# Patient Record
Sex: MALE | Race: WHITE | NOT HISPANIC OR LATINO | URBAN - METROPOLITAN AREA
[De-identification: names, ages, dates, MRNs, and addresses within clinical notes are randomized per-mention and may not be internally consistent; named-entity substitution may affect disease eponyms.]

---

## 2017-01-17 PROBLEM — M54.2 CHRONIC NECK PAIN: Status: ACTIVE | Noted: 2017-01-17

## 2017-01-17 PROBLEM — G89.29 CHRONIC NECK PAIN: Status: ACTIVE | Noted: 2017-01-17

## 2017-03-07 ENCOUNTER — APPOINTMENT (RX ONLY)
Dept: URBAN - METROPOLITAN AREA CLINIC 349 | Facility: CLINIC | Age: 62
Setting detail: DERMATOLOGY
End: 2017-03-07

## 2017-03-07 DIAGNOSIS — I78.8 OTHER DISEASES OF CAPILLARIES: ICD-10-CM

## 2017-03-07 DIAGNOSIS — L57.8 OTHER SKIN CHANGES DUE TO CHRONIC EXPOSURE TO NONIONIZING RADIATION: ICD-10-CM

## 2017-03-07 PROBLEM — H91.90 UNSPECIFIED HEARING LOSS, UNSPECIFIED EAR: Status: ACTIVE | Noted: 2017-03-07

## 2017-03-07 PROBLEM — M12.9 ARTHROPATHY, UNSPECIFIED: Status: ACTIVE | Noted: 2017-03-07

## 2017-03-07 PROBLEM — D23.72 OTHER BENIGN NEOPLASM OF SKIN OF LEFT LOWER LIMB, INCLUDING HIP: Status: ACTIVE | Noted: 2017-03-07

## 2017-03-07 PROCEDURE — ? COUNSELING

## 2017-03-07 PROCEDURE — 99242 OFF/OP CONSLTJ NEW/EST SF 20: CPT

## 2017-03-07 ASSESSMENT — LOCATION DETAILED DESCRIPTION DERM
LOCATION DETAILED: LEFT CENTRAL MALAR CHEEK
LOCATION DETAILED: RIGHT INFERIOR CENTRAL MALAR CHEEK
LOCATION DETAILED: LEFT CHIN
LOCATION DETAILED: INFERIOR MID FOREHEAD

## 2017-03-07 ASSESSMENT — LOCATION ZONE DERM: LOCATION ZONE: FACE

## 2017-03-07 ASSESSMENT — LOCATION SIMPLE DESCRIPTION DERM
LOCATION SIMPLE: RIGHT CHEEK
LOCATION SIMPLE: CHIN
LOCATION SIMPLE: LEFT CHEEK
LOCATION SIMPLE: INFERIOR FOREHEAD

## 2017-07-10 PROBLEM — F33.1 MDD (MAJOR DEPRESSIVE DISORDER), RECURRENT EPISODE, MODERATE (HCC): Status: ACTIVE | Noted: 2017-07-10

## 2017-07-25 PROBLEM — K42.9 UMBILICAL HERNIA: Status: ACTIVE | Noted: 2017-07-25

## 2018-12-05 PROBLEM — E66.01 SEVERE OBESITY (HCC): Status: ACTIVE | Noted: 2018-12-05

## 2022-03-19 PROBLEM — G89.29 CHRONIC NECK PAIN: Status: ACTIVE | Noted: 2017-01-17

## 2022-03-19 PROBLEM — M54.2 CHRONIC NECK PAIN: Status: ACTIVE | Noted: 2017-01-17

## 2022-03-19 PROBLEM — K42.9 UMBILICAL HERNIA: Status: ACTIVE | Noted: 2017-07-25

## 2022-03-19 PROBLEM — E66.01 SEVERE OBESITY (HCC): Status: ACTIVE | Noted: 2018-12-05

## 2022-03-19 PROBLEM — F33.1 MDD (MAJOR DEPRESSIVE DISORDER), RECURRENT EPISODE, MODERATE (HCC): Status: ACTIVE | Noted: 2017-07-10

## 2022-03-21 ENCOUNTER — HOSPITAL ENCOUNTER (OUTPATIENT)
Dept: CT IMAGING | Age: 67
Discharge: HOME | End: 2022-03-21
Attending: FAMILY MEDICINE
Payer: COMMERCIAL

## 2022-03-21 ENCOUNTER — TRANSCRIBE ORDER (OUTPATIENT)
Dept: SCHEDULING | Age: 67
End: 2022-03-21

## 2022-03-21 DIAGNOSIS — R10.32 ABDOMINAL PAIN, LEFT LOWER QUADRANT: ICD-10-CM

## 2022-03-21 DIAGNOSIS — R10.32 ABDOMINAL PAIN, LEFT LOWER QUADRANT: Primary | ICD-10-CM

## 2022-03-21 PROCEDURE — 74011000258 HC RX REV CODE- 258: Performed by: FAMILY MEDICINE

## 2022-03-21 PROCEDURE — 74177 CT ABD & PELVIS W/CONTRAST: CPT

## 2022-03-21 PROCEDURE — 74011000636 HC RX REV CODE- 636: Performed by: FAMILY MEDICINE

## 2022-03-21 RX ORDER — SODIUM CHLORIDE 0.9 % (FLUSH) 0.9 %
10 SYRINGE (ML) INJECTION
Status: COMPLETED | OUTPATIENT
Start: 2022-03-21 | End: 2022-03-21

## 2022-03-21 RX ADMIN — Medication 10 ML: at 14:51

## 2022-03-21 RX ADMIN — DIATRIZOATE MEGLUMINE AND DIATRIZOATE SODIUM 15 ML: 660; 100 LIQUID ORAL; RECTAL at 14:51

## 2022-03-21 RX ADMIN — IOPAMIDOL 100 ML: 755 INJECTION, SOLUTION INTRAVENOUS at 14:47

## 2022-03-21 RX ADMIN — SODIUM CHLORIDE 100 ML: 9 INJECTION, SOLUTION INTRAVENOUS at 14:51

## 2023-01-06 ENCOUNTER — OFFICE VISIT (OUTPATIENT)
Dept: UROLOGY | Age: 68
End: 2023-01-06

## 2023-01-06 DIAGNOSIS — N40.1 BPH WITH OBSTRUCTION/LOWER URINARY TRACT SYMPTOMS: ICD-10-CM

## 2023-01-06 DIAGNOSIS — N13.8 BPH WITH OBSTRUCTION/LOWER URINARY TRACT SYMPTOMS: ICD-10-CM

## 2023-01-06 DIAGNOSIS — R35.0 FREQUENCY OF MICTURITION: Primary | ICD-10-CM

## 2023-01-06 DIAGNOSIS — N40.2 NODULAR PROSTATE WITHOUT LOWER URINARY TRACT SYMPTOMS: ICD-10-CM

## 2023-01-06 LAB
BILIRUBIN, URINE, POC: NEGATIVE
BLOOD URINE, POC: NEGATIVE
GLUCOSE URINE, POC: NEGATIVE
KETONES, URINE, POC: NEGATIVE
LEUKOCYTE ESTERASE, URINE, POC: NEGATIVE
NITRITE, URINE, POC: NEGATIVE
PH, URINE, POC: 5.5 (ref 4.6–8)
PROTEIN,URINE, POC: 30
PSA SERPL-MCNC: 3.4 NG/ML
SPECIFIC GRAVITY, URINE, POC: 1.02 (ref 1–1.03)
URINALYSIS CLARITY, POC: ABNORMAL
URINALYSIS COLOR, POC: ABNORMAL
UROBILINOGEN, POC: ABNORMAL

## 2023-01-06 NOTE — PROGRESS NOTES
[facility]  [address] [place] [address]  [facility], [address] [facility]  [address] [facility] [place]. : 1955    Chief Complaint   Patient presents with    Other     Yearly- frequency         HPI     Prudence Kearney. is a 74 y.o. male    referred by Dr. Nichelle Palacios at  [facility] for evaluation and treatment of prostate nodule. Had right prostate nodule on exam. PSA 1.85 in 10-21. Takes tamsulosin. He doesn't have severe LUTS. Stream is a little slow. No hematuria . JENNIFER in : not enlarged, asymmetric, right>left, normal consistency, no nodules  asymmetric prostate, no nodule. Not very suspicious. Recommended yearly PSA monitoring   He states that he had his PSA done by his PCP in 10-22 and he was told that PSA was \"a little high\". I don't see the report .   Past Medical History:   Diagnosis Date    Cervical spondylosis 10/1/2013    Depression     Dyslipidemia     GERD (gastroesophageal reflux disease)     History of CVA (cerebrovascular accident)     embolic    HTN (hypertension), benign     Impaired fasting glucose 10/1/2013    Low HDL (under 40) 10/1/2013    Microalbuminuria     Migraine headache 10/1/2013    NAFLD (nonalcoholic fatty liver disease) 10/1/2013    LAYTON (obstructive sleep apnea) 10/1/2013     Past Surgical History:   Procedure Laterality Date    CERVICAL FUSION      C5-6    CHOLECYSTECTOMY      HERNIA REPAIR      MITRAL VALVE REPLACEMENT      OK CARDIAC SURG PROCEDURE UNLIST      mitral valve repair    TONSILLECTOMY       Current Outpatient Medications   Medication Sig Dispense Refill    metFORMIN (GLUCOPHAGE) 500 MG tablet metformin 500 mg tablet      aspirin 81 MG EC tablet Take by mouth daily      atorvastatin (LIPITOR) 20 MG tablet Take 20 mg by mouth      esomeprazole (NEXIUM) 20 MG delayed release capsule Take 1 capsule by mouth 2 times daily      magnesium oxide (MAG-OX) 400 MG tablet Take 400 mg by mouth daily      nabumetone (RELAFEN) 500 MG tablet 1 tab po bid prn pain      rizatriptan (MAXALT-MLT) 10 MG disintegrating tablet 1 tab po at onset of migraine; may repeat in 2 hrs if unresolved; max 2 tabs/24 hrs      sertraline (ZOLOFT) 100 MG tablet Take one tab po qd      tamsulosin (FLOMAX) 0.4 MG capsule Take 0.4 mg by mouth daily      topiramate (TOPAMAX) 50 MG tablet Take 50 mg by mouth 2 times daily      Wheat Dextrin (BENEFIBER) POWD Take by mouth      fexofenadine (ALLEGRA) 180 MG tablet Take by mouth      loratadine (CLARITIN) 10 MG capsule Take by mouth       No current facility-administered medications for this visit. Allergies   Allergen Reactions    Penicillins Other (See Comments)     Social History     Socioeconomic History    Marital status:      Spouse name: Not on file    Number of children: Not on file    Years of education: Not on file    Highest education level: Not on file   Occupational History    Not on file   Tobacco Use    Smoking status: Never    Smokeless tobacco: Never   Substance and Sexual Activity    Alcohol use: No    Drug use: No    Sexual activity: Not on file   Other Topics Concern    Not on file   Social History Narrative    Not on file     Social Determinants of Health     Financial Resource Strain: Not on file   Food Insecurity: Not on file   Transportation Needs: Not on file   Physical Activity: Not on file   Stress: Not on file   Social Connections: Not on file   Intimate Partner Violence: Not on file   Housing Stability: Not on file     Family History   Problem Relation Age of Onset    Cancer Father         prostate    Hypertension Mother     Kidney Disease Mother         Felicity dz       Review of Systems  Constitutional: Negative  GI: Neg GI ROS  Genitourinary: Genitourinary negative    There were no vitals taken for this visit.     Urinalysis  UA - Dipstick  Results for orders placed or performed in visit on 01/06/23   AMB POC URINALYSIS DIP STICK AUTO W/O MICRO   Result Value Ref Range Color (UA POC)      Clarity (UA POC)      Glucose, Urine, POC Negative Negative    Bilirubin, Urine, POC Negative Negative    KETONES, Urine, POC Negative Negative    Specific Gravity, Urine, POC 1.025 1.001 - 1.035    Blood (UA POC) Negative Negative    pH, Urine, POC 5.5 4.6 - 8.0    Protein, Urine, POC 30 Negative    Urobilinogen, POC 2 mg/dL     Nitrite, Urine, POC Negative Negative    Leukocyte Esterase, Urine, POC Negative Negative       UA - Micro  WBC - 0  RBC - 0  Bacteria - 0  Epith - 0    Physical Exam    Assessment and Plan    ICD-10-CM    1. Frequency of micturition  R35.0 AMB POC URINALYSIS DIP STICK AUTO W/O MICRO      2. Nodular prostate without lower urinary tract symptoms  N40.2 AMB POC URINALYSIS DIP STICK AUTO W/O MICRO     PSA, Diagnostic      3. BPH with obstruction/lower urinary tract symptoms  N40.1     N13.8         He would like to have the PSA checked today. Call patient with results  Continue tamsulosin. Orders Placed This Encounter   Procedures    PSA, Diagnostic     Standing Status:   Future     Standing Expiration Date:   1/6/2024    AMB POC URINALYSIS DIP STICK AUTO W/O MICRO       Follow-up and Dispositions    Return for call patient to arrange follow-up.

## 2023-09-06 NOTE — PROGRESS NOTES
[address] [place] [address]  [phone]    Patient Name:  Evan Bustamante. YOB: 1955      Office Visit 9/7/2023    CHIEF COMPLAINT:    Chief Complaint   Patient presents with    Follow-up         HISTORY OF PRESENT ILLNESS:  Patient is a 73 yo  male seen today for follow up of sleep apnea. Patient's sleep study in 2006 revealed RDI of 75 with lowest desaturation 73%. He is prescribed cpap therapy with a humidifier set at 14cm with a nasal mask. Most recent download reveals AHI on PAP therapy is 0.8, leak is 35.8 and the hourly usage is 9 hours 25 minutes nightly. The overall use is 3458 hours with days greater than four hours at 364/365. The patient is compliant with the Pap therapy and is feeling better as a result. Patient states he is rested in the morning, does have some daytime fatigue and may nap if he has been working outside in the heat. Current [place] score is 10/24. He is falling asleep easily and denies any frequent nocturnal awakenings. States CPAP is functioning well and the pressure feels fine. He does not notice a mask leak. He denies any issues with hypertension or lower extremity edema. He denies any changes to medical history or weight changes since last visit.     Download        [place] Sleepiness Scale  Sleep Medicine 9/7/2023 1/31/2022   Sitting and reading 2 1   Watching TV 1 1   Sitting, inactive in a public place (e.g. a theatre or a meeting) 1 1   As a passenger in a car for an hour without a break 2 2   Lying down to rest in the afternoon when circumstances permit 3 1   Sitting and talking to someone 0 0   Sitting quietly after a lunch without alcohol 1 1   In a car, while stopped for a few minutes in traffic 0 0   [place] Sleepiness Score 10 7          Past Medical History:   Diagnosis Date    Cervical spondylosis 10/1/2013    Depression     Dyslipidemia     GERD (gastroesophageal reflux disease)

## 2023-09-07 ENCOUNTER — OFFICE VISIT (OUTPATIENT)
Dept: SLEEP MEDICINE | Age: 68
End: 2023-09-07
Payer: MEDICARE

## 2023-09-07 VITALS
BODY MASS INDEX: 32.65 KG/M2 | OXYGEN SATURATION: 97 % | RESPIRATION RATE: 17 BRPM | SYSTOLIC BLOOD PRESSURE: 138 MMHG | DIASTOLIC BLOOD PRESSURE: 78 MMHG | TEMPERATURE: 97 F | WEIGHT: 196 LBS | HEIGHT: 65 IN | HEART RATE: 77 BPM

## 2023-09-07 DIAGNOSIS — G47.33 OBSTRUCTIVE SLEEP APNEA (ADULT) (PEDIATRIC): Primary | ICD-10-CM

## 2023-09-07 PROCEDURE — 99213 OFFICE O/P EST LOW 20 MIN: CPT | Performed by: STUDENT IN AN ORGANIZED HEALTH CARE EDUCATION/TRAINING PROGRAM

## 2023-09-07 PROCEDURE — 3078F DIAST BP <80 MM HG: CPT | Performed by: STUDENT IN AN ORGANIZED HEALTH CARE EDUCATION/TRAINING PROGRAM

## 2023-09-07 PROCEDURE — 1123F ACP DISCUSS/DSCN MKR DOCD: CPT | Performed by: STUDENT IN AN ORGANIZED HEALTH CARE EDUCATION/TRAINING PROGRAM

## 2023-09-07 PROCEDURE — 1036F TOBACCO NON-USER: CPT | Performed by: STUDENT IN AN ORGANIZED HEALTH CARE EDUCATION/TRAINING PROGRAM

## 2023-09-07 PROCEDURE — 3075F SYST BP GE 130 - 139MM HG: CPT | Performed by: STUDENT IN AN ORGANIZED HEALTH CARE EDUCATION/TRAINING PROGRAM

## 2023-09-07 PROCEDURE — G8427 DOCREV CUR MEDS BY ELIG CLIN: HCPCS | Performed by: STUDENT IN AN ORGANIZED HEALTH CARE EDUCATION/TRAINING PROGRAM

## 2023-09-07 PROCEDURE — 3017F COLORECTAL CA SCREEN DOC REV: CPT | Performed by: STUDENT IN AN ORGANIZED HEALTH CARE EDUCATION/TRAINING PROGRAM

## 2023-09-07 PROCEDURE — G8417 CALC BMI ABV UP PARAM F/U: HCPCS | Performed by: STUDENT IN AN ORGANIZED HEALTH CARE EDUCATION/TRAINING PROGRAM

## 2023-09-07 ASSESSMENT — SLEEP AND FATIGUE QUESTIONNAIRES
HOW LIKELY ARE YOU TO NOD OFF OR FALL ASLEEP IN A CAR, WHILE STOPPED FOR A FEW MINUTES IN TRAFFIC: 0
HOW LIKELY ARE YOU TO NOD OFF OR FALL ASLEEP WHILE WATCHING TV: 1
HOW LIKELY ARE YOU TO NOD OFF OR FALL ASLEEP WHILE SITTING AND TALKING TO SOMEONE: 0
HOW LIKELY ARE YOU TO NOD OFF OR FALL ASLEEP WHILE LYING DOWN TO REST IN THE AFTERNOON WHEN CIRCUMSTANCES PERMIT: 3
HOW LIKELY ARE YOU TO NOD OFF OR FALL ASLEEP WHILE SITTING AND READING: 2
HOW LIKELY ARE YOU TO NOD OFF OR FALL ASLEEP WHILE SITTING QUIETLY AFTER LUNCH WITHOUT ALCOHOL: 1
HOW LIKELY ARE YOU TO NOD OFF OR FALL ASLEEP WHILE SITTING INACTIVE IN A PUBLIC PLACE: 1
HOW LIKELY ARE YOU TO NOD OFF OR FALL ASLEEP WHEN YOU ARE A PASSENGER IN A CAR FOR AN HOUR WITHOUT A BREAK: 2
ESS TOTAL SCORE: 10

## 2023-09-07 NOTE — PATIENT INSTRUCTIONS
The company who will be taking care of your CPAP supplies is:     Address: [address] [place] [address] [address], [address]  Phone: [phone] Option #1  Fax: [phone]

## 2024-09-09 ENCOUNTER — OFFICE VISIT (OUTPATIENT)
Dept: SLEEP MEDICINE | Age: 69
End: 2024-09-09
Payer: MEDICARE

## 2024-09-09 VITALS
HEIGHT: 65 IN | OXYGEN SATURATION: 91 % | SYSTOLIC BLOOD PRESSURE: 155 MMHG | WEIGHT: 204 LBS | BODY MASS INDEX: 33.99 KG/M2 | HEART RATE: 84 BPM | RESPIRATION RATE: 17 BRPM | DIASTOLIC BLOOD PRESSURE: 72 MMHG

## 2024-09-09 DIAGNOSIS — G47.33 OSA (OBSTRUCTIVE SLEEP APNEA): Primary | ICD-10-CM

## 2024-09-09 DIAGNOSIS — E66.9 OBESITY (BMI 30.0-34.9): ICD-10-CM

## 2024-09-09 PROBLEM — E66.811 OBESITY (BMI 30.0-34.9): Status: ACTIVE | Noted: 2024-09-09

## 2024-09-09 PROCEDURE — G8427 DOCREV CUR MEDS BY ELIG CLIN: HCPCS | Performed by: NURSE PRACTITIONER

## 2024-09-09 PROCEDURE — G8417 CALC BMI ABV UP PARAM F/U: HCPCS | Performed by: NURSE PRACTITIONER

## 2024-09-09 PROCEDURE — G2211 COMPLEX E/M VISIT ADD ON: HCPCS | Performed by: NURSE PRACTITIONER

## 2024-09-09 PROCEDURE — 3017F COLORECTAL CA SCREEN DOC REV: CPT | Performed by: NURSE PRACTITIONER

## 2024-09-09 PROCEDURE — 1123F ACP DISCUSS/DSCN MKR DOCD: CPT | Performed by: NURSE PRACTITIONER

## 2024-09-09 PROCEDURE — 1036F TOBACCO NON-USER: CPT | Performed by: NURSE PRACTITIONER

## 2024-09-09 PROCEDURE — 3077F SYST BP >= 140 MM HG: CPT | Performed by: NURSE PRACTITIONER

## 2024-09-09 PROCEDURE — 3078F DIAST BP <80 MM HG: CPT | Performed by: NURSE PRACTITIONER

## 2024-09-09 PROCEDURE — 99213 OFFICE O/P EST LOW 20 MIN: CPT | Performed by: NURSE PRACTITIONER

## 2024-09-09 RX ORDER — NORTRIPTYLINE HCL 10 MG
CAPSULE ORAL
COMMUNITY
Start: 2024-07-23

## 2024-09-09 ASSESSMENT — SLEEP AND FATIGUE QUESTIONNAIRES
HOW LIKELY ARE YOU TO NOD OFF OR FALL ASLEEP IN A CAR, WHILE STOPPED FOR A FEW MINUTES IN TRAFFIC: WOULD NEVER DOZE
HOW LIKELY ARE YOU TO NOD OFF OR FALL ASLEEP WHILE SITTING AND TALKING TO SOMEONE: WOULD NEVER DOZE
HOW LIKELY ARE YOU TO NOD OFF OR FALL ASLEEP WHILE SITTING AND READING: SLIGHT CHANCE OF DOZING
ESS TOTAL SCORE: 6
HOW LIKELY ARE YOU TO NOD OFF OR FALL ASLEEP WHILE SITTING INACTIVE IN A PUBLIC PLACE: WOULD NEVER DOZE
HOW LIKELY ARE YOU TO NOD OFF OR FALL ASLEEP WHEN YOU ARE A PASSENGER IN A CAR FOR AN HOUR WITHOUT A BREAK: MODERATE CHANCE OF DOZING
HOW LIKELY ARE YOU TO NOD OFF OR FALL ASLEEP WHILE WATCHING TV: SLIGHT CHANCE OF DOZING
HOW LIKELY ARE YOU TO NOD OFF OR FALL ASLEEP WHILE SITTING QUIETLY AFTER LUNCH WITHOUT ALCOHOL: SLIGHT CHANCE OF DOZING
HOW LIKELY ARE YOU TO NOD OFF OR FALL ASLEEP WHILE LYING DOWN TO REST IN THE AFTERNOON WHEN CIRCUMSTANCES PERMIT: SLIGHT CHANCE OF DOZING

## 2025-04-11 ENCOUNTER — OFFICE VISIT (OUTPATIENT)
Dept: SLEEP MEDICINE | Age: 70
End: 2025-04-11
Payer: MEDICARE

## 2025-04-11 VITALS
HEART RATE: 88 BPM | BODY MASS INDEX: 36.19 KG/M2 | OXYGEN SATURATION: 95 % | SYSTOLIC BLOOD PRESSURE: 143 MMHG | RESPIRATION RATE: 19 BRPM | HEIGHT: 64 IN | WEIGHT: 212 LBS | DIASTOLIC BLOOD PRESSURE: 84 MMHG | TEMPERATURE: 97.2 F

## 2025-04-11 DIAGNOSIS — G47.33 OSA (OBSTRUCTIVE SLEEP APNEA): Primary | ICD-10-CM

## 2025-04-11 DIAGNOSIS — E66.9 OBESITY (BMI 30-39.9): ICD-10-CM

## 2025-04-11 PROCEDURE — 3079F DIAST BP 80-89 MM HG: CPT | Performed by: PHYSICIAN ASSISTANT

## 2025-04-11 PROCEDURE — 3017F COLORECTAL CA SCREEN DOC REV: CPT | Performed by: PHYSICIAN ASSISTANT

## 2025-04-11 PROCEDURE — G8417 CALC BMI ABV UP PARAM F/U: HCPCS | Performed by: PHYSICIAN ASSISTANT

## 2025-04-11 PROCEDURE — 1036F TOBACCO NON-USER: CPT | Performed by: PHYSICIAN ASSISTANT

## 2025-04-11 PROCEDURE — 1160F RVW MEDS BY RX/DR IN RCRD: CPT | Performed by: PHYSICIAN ASSISTANT

## 2025-04-11 PROCEDURE — G8427 DOCREV CUR MEDS BY ELIG CLIN: HCPCS | Performed by: PHYSICIAN ASSISTANT

## 2025-04-11 PROCEDURE — 3077F SYST BP >= 140 MM HG: CPT | Performed by: PHYSICIAN ASSISTANT

## 2025-04-11 PROCEDURE — 1159F MED LIST DOCD IN RCRD: CPT | Performed by: PHYSICIAN ASSISTANT

## 2025-04-11 PROCEDURE — 1123F ACP DISCUSS/DSCN MKR DOCD: CPT | Performed by: PHYSICIAN ASSISTANT

## 2025-04-11 PROCEDURE — 99214 OFFICE O/P EST MOD 30 MIN: CPT | Performed by: PHYSICIAN ASSISTANT

## 2025-04-11 ASSESSMENT — SLEEP AND FATIGUE QUESTIONNAIRES
HOW LIKELY ARE YOU TO NOD OFF OR FALL ASLEEP WHEN YOU ARE A PASSENGER IN A CAR FOR AN HOUR WITHOUT A BREAK: MODERATE CHANCE OF DOZING
HOW LIKELY ARE YOU TO NOD OFF OR FALL ASLEEP WHILE LYING DOWN TO REST IN THE AFTERNOON WHEN CIRCUMSTANCES PERMIT: HIGH CHANCE OF DOZING
HOW LIKELY ARE YOU TO NOD OFF OR FALL ASLEEP WHILE WATCHING TV: SLIGHT CHANCE OF DOZING
ESS TOTAL SCORE: 8
HOW LIKELY ARE YOU TO NOD OFF OR FALL ASLEEP WHILE SITTING INACTIVE IN A PUBLIC PLACE: WOULD NEVER DOZE
HOW LIKELY ARE YOU TO NOD OFF OR FALL ASLEEP WHILE SITTING AND READING: MODERATE CHANCE OF DOZING
HOW LIKELY ARE YOU TO NOD OFF OR FALL ASLEEP WHILE SITTING AND TALKING TO SOMEONE: WOULD NEVER DOZE
HOW LIKELY ARE YOU TO NOD OFF OR FALL ASLEEP IN A CAR, WHILE STOPPED FOR A FEW MINUTES IN TRAFFIC: WOULD NEVER DOZE
HOW LIKELY ARE YOU TO NOD OFF OR FALL ASLEEP WHILE SITTING QUIETLY AFTER LUNCH WITHOUT ALCOHOL: WOULD NEVER DOZE

## 2025-04-11 NOTE — PROGRESS NOTES
[facility]  [address]  [place] [address]  [phone]    Patient Name:  Saul Yates Jr.  YOB: 1955      Office Visit 4/11/2025    CHIEF COMPLAINT:    Chief Complaint   Patient presents with    Follow-up    Sleep Apnea         HISTORY OF PRESENT ILLNESS:  Patient is an 69 y.o. male seen today for follow up of LAYTON. Pt had a PSG/HST on 11/17/06 with an RDI of 75/hr with desaturations to 73%. Pt is on CPAP 14 cm H2O.   Pt reports that he is doing well. He uses a nasal mask. He usually sleeps well. He admits that he is not very active and so his energy is not as good as he would like. He also notes that he has gained some weight since his last visit. He is under a lot of stress with a friend of his being in the hospital for over one month. He is going to visit him after his appt today and he thinks that that is why his BP is elevated today.   Pt does nap at times but he always uses his CPAP when he sleeps. He has excellent compliance with use 209/214 days with an average use of 9 hrs and 38 mins per day. Pt has a mask leak of 1.4L/min with an AHI of 0.7/hr. Pt is benefiting and tolerating PAP therapy.             4/11/2025    12:39 PM 9/9/2024    10:35 AM 9/7/2023     9:29 AM 1/31/2022     1:46 PM   Sleep Medicine   Sitting and reading 2 1 2 1   Watching TV 1 1 1 1   Sitting, inactive in a public place (e.g. a theatre or a meeting) 0 0 1 1   As a passenger in a car for an hour without a break 2 2 2 2   Lying down to rest in the afternoon when circumstances permit 3 1 3 1   Sitting and talking to someone 0 0 0 0   Sitting quietly after a lunch without alcohol 0 1 1 1   In a car, while stopped for a few minutes in traffic 0 0 0 0   [place] Sleepiness Score 8 6 10 7         Past Medical History:   Diagnosis Date    Cervical spondylosis 10/1/2013    Depression     Dyslipidemia     GERD (gastroesophageal reflux disease)     History of CVA (cerebrovascular accident)

## 2025-06-11 ENCOUNTER — OFFICE VISIT (OUTPATIENT)
Dept: UROLOGY | Age: 70
End: 2025-06-11
Payer: COMMERCIAL

## 2025-06-11 DIAGNOSIS — N13.8 BPH WITH OBSTRUCTION/LOWER URINARY TRACT SYMPTOMS: ICD-10-CM

## 2025-06-11 DIAGNOSIS — R35.0 FREQUENCY OF MICTURITION: Primary | ICD-10-CM

## 2025-06-11 DIAGNOSIS — N40.2 NODULAR PROSTATE WITHOUT LOWER URINARY TRACT SYMPTOMS: ICD-10-CM

## 2025-06-11 DIAGNOSIS — N40.1 BPH WITH OBSTRUCTION/LOWER URINARY TRACT SYMPTOMS: ICD-10-CM

## 2025-06-11 LAB
BILIRUBIN, URINE, POC: NEGATIVE
BLOOD URINE, POC: NEGATIVE
GLUCOSE URINE, POC: NEGATIVE MG/DL
KETONES, URINE, POC: NEGATIVE MG/DL
LEUKOCYTE ESTERASE, URINE, POC: NEGATIVE
NITRITE, URINE, POC: NEGATIVE
PH, URINE, POC: 5.5 (ref 4.6–8)
PROTEIN,URINE, POC: NEGATIVE MG/DL
SPECIFIC GRAVITY, URINE, POC: 1.01 (ref 1–1.03)
URINALYSIS CLARITY, POC: NORMAL
URINALYSIS COLOR, POC: NORMAL
UROBILINOGEN, POC: NORMAL MG/DL

## 2025-06-11 PROCEDURE — 99213 OFFICE O/P EST LOW 20 MIN: CPT | Performed by: UROLOGY

## 2025-06-11 PROCEDURE — 1159F MED LIST DOCD IN RCRD: CPT | Performed by: UROLOGY

## 2025-06-11 PROCEDURE — 1123F ACP DISCUSS/DSCN MKR DOCD: CPT | Performed by: UROLOGY

## 2025-06-11 PROCEDURE — 81003 URINALYSIS AUTO W/O SCOPE: CPT | Performed by: UROLOGY

## 2025-06-11 RX ORDER — PREDNISOLONE ACETATE 10 MG/ML
SUSPENSION/ DROPS OPHTHALMIC
COMMUNITY
Start: 2025-05-13

## 2025-06-11 RX ORDER — LIDOCAINE 4 G/G
PATCH TOPICAL
COMMUNITY
Start: 2025-05-28

## 2025-06-11 RX ORDER — TRIAMCINOLONE ACETONIDE 40 MG/ML
INJECTION, SUSPENSION INTRA-ARTICULAR; INTRAMUSCULAR
COMMUNITY
Start: 2025-06-02

## 2025-06-11 ASSESSMENT — ENCOUNTER SYMPTOMS
BACK PAIN: 0
NAUSEA: 0

## 2025-06-11 NOTE — PROGRESS NOTES
Not on file   Physical Activity: Not on file (10/6/2023)   Stress: Not on file   Social Connections: Unknown (3/19/2021)    Received from [facility]    Social Connections     Frequency of Communication with Friends and Family: Not asked     Frequency of Social Gatherings with Friends and Family: Not asked   Intimate Partner Violence: Unknown (3/19/2021)    Received from [facility]    Intimate Partner Violence     Fear of Current or Ex-Partner: Not asked     Emotionally Abused: Not asked     Physically Abused: Not asked     Sexually Abused: Not asked   Housing Stability: Not At Risk (3/9/2022)    Received from [facility]    Housing Stability     Was there a time when you did not have a steady place to sleep: Unrecognized value     Worried that the place you are staying is making you sick: Unrecognized value     Family History   Problem Relation Age of Onset    Cancer Father         prostate    Hypertension Mother     Kidney Disease Mother         Brights dz       Review of Systems  Constitutional:   Negative for fever.  GI:  Negative for nausea.  Genitourinary: Positive for slower stream.  Musculoskeletal:  Negative for back pain.      There were no vitals taken for this visit.    Urinalysis  UA - Dipstick  Results for orders placed or performed in visit on 06/11/25   AMB POC URINALYSIS DIP STICK AUTO W/O MICRO    Collection Time: 06/11/25  4:18 PM   Result Value Ref Range    Color (UA POC)      Clarity (UA POC)      Glucose, Urine, POC Negative Negative mg/dL    Bilirubin, Urine, POC Negative Negative    KETONES, Urine, POC Negative Negative mg/dL    Specific Gravity, Urine, POC 1.010 1.001 - 1.035    Blood (UA POC) Negative     pH, Urine, POC 5.5 4.6 - 8.0    Protein, Urine, POC Negative Negative mg/dL    Urobilinogen, POC 0.2 mg/dL <1.1 mg/dL    Nitrite, Urine, POC Negative Negative    Leukocyte Esterase, Urine, POC Negative Negative       UA - Micro  WBC -